# Patient Record
Sex: FEMALE | Race: WHITE
[De-identification: names, ages, dates, MRNs, and addresses within clinical notes are randomized per-mention and may not be internally consistent; named-entity substitution may affect disease eponyms.]

---

## 2017-06-14 ENCOUNTER — HOSPITAL ENCOUNTER (OUTPATIENT)
Dept: HOSPITAL 45 - C.ULTR | Age: 72
Discharge: HOME | End: 2017-06-14
Attending: FAMILY MEDICINE
Payer: COMMERCIAL

## 2017-06-14 DIAGNOSIS — E04.2: Primary | ICD-10-CM

## 2017-06-14 NOTE — DIAGNOSTIC IMAGING REPORT
THYROID ULTRASOUND



HISTORY:      NON TOXIC MULTI NODULAR GOITER



COMPARISON:  Thyroid ultrasound 12/12/2014.



FINDINGS:



Right lobe: 5.1 x 2.4 x 2.3 cm. There is a 1.7 x 1.3 x 1.1 cm heterogeneous

nodule within the interpolar region. This is similar to the prior study. There

is also stable 1.1 cm hyperechoic nodule within the lower pole.



Left lobe: 6.9 x 5.3 x 3.8 cm. Dominant solid and cystic nodule measures 5.1 x

5.1 x 4.0 cm. Given differences in measuring technique this appears to be

similar in size compared to the prior study. There are 2 additional

subcentimeter nodules within the upper pole measuring up to 9 mm.



Isthmus: 5 mm in thickness. No nodules.



IMPRESSION:  

Multinodular thyroid gland with the dominant nodule within the left thyroid lobe

measuring 5.1 cm. These are not significantly changed compared to the 2014

examination.







Electronically signed by:  Joey Lagos M.D.

6/14/2017 10:33 AM



Dictated Date/Time:  6/14/2017 10:30 AM

## 2018-07-31 ENCOUNTER — HOSPITAL ENCOUNTER (OUTPATIENT)
Dept: HOSPITAL 45 - C.LABBC | Age: 73
Discharge: HOME | End: 2018-07-31
Attending: INTERNAL MEDICINE
Payer: COMMERCIAL

## 2018-07-31 DIAGNOSIS — I48.91: ICD-10-CM

## 2018-07-31 DIAGNOSIS — I48.92: ICD-10-CM

## 2018-07-31 DIAGNOSIS — K21.0: ICD-10-CM

## 2018-07-31 DIAGNOSIS — Z00.00: Primary | ICD-10-CM

## 2018-07-31 DIAGNOSIS — G47.00: ICD-10-CM

## 2018-07-31 DIAGNOSIS — D69.6: ICD-10-CM

## 2018-07-31 LAB
ALBUMIN SERPL-MCNC: 3.7 GM/DL (ref 3.4–5)
ALP SERPL-CCNC: 50 U/L (ref 45–117)
ALT SERPL-CCNC: 19 U/L (ref 12–78)
AST SERPL-CCNC: 16 U/L (ref 15–37)
BASOPHILS # BLD: 0.02 K/UL (ref 0–0.2)
BASOPHILS NFR BLD: 0.5 %
BUN SERPL-MCNC: 23 MG/DL (ref 7–18)
CALCIUM SERPL-MCNC: 8.7 MG/DL (ref 8.5–10.1)
CO2 SERPL-SCNC: 27 MMOL/L (ref 21–32)
CREAT SERPL-MCNC: 0.76 MG/DL (ref 0.6–1.2)
EOS ABS #: 0.06 K/UL (ref 0–0.5)
EOSINOPHIL NFR BLD AUTO: 107 K/UL (ref 130–400)
GLUCOSE SERPL-MCNC: 101 MG/DL (ref 70–99)
HCT VFR BLD CALC: 42.5 % (ref 37–47)
HGB BLD-MCNC: 14.4 G/DL (ref 12–16)
IG#: 0.01 K/UL (ref 0–0.02)
IMM GRANULOCYTES NFR BLD AUTO: 24.4 %
LYMPHOCYTES # BLD: 0.94 K/UL (ref 1.2–3.4)
MCH RBC QN AUTO: 33.6 PG (ref 25–34)
MCHC RBC AUTO-ENTMCNC: 33.9 G/DL (ref 32–36)
MCV RBC AUTO: 99.1 FL (ref 80–100)
MONO ABS #: 0.27 K/UL (ref 0.11–0.59)
MONOCYTES NFR BLD: 7 %
NEUT ABS #: 2.56 K/UL (ref 1.4–6.5)
NEUTROPHILS # BLD AUTO: 1.6 %
NEUTROPHILS NFR BLD AUTO: 66.2 %
PMV BLD AUTO: 13 FL (ref 7.4–10.4)
POTASSIUM SERPL-SCNC: 4.2 MMOL/L (ref 3.5–5.1)
PROT SERPL-MCNC: 6.3 GM/DL (ref 6.4–8.2)
RED CELL DISTRIBUTION WIDTH CV: 13.4 % (ref 11.5–14.5)
RED CELL DISTRIBUTION WIDTH SD: 48 FL (ref 36.4–46.3)
SODIUM SERPL-SCNC: 140 MMOL/L (ref 136–145)
WBC # BLD AUTO: 3.86 K/UL (ref 4.8–10.8)